# Patient Record
Sex: MALE | Race: BLACK OR AFRICAN AMERICAN | URBAN - METROPOLITAN AREA
[De-identification: names, ages, dates, MRNs, and addresses within clinical notes are randomized per-mention and may not be internally consistent; named-entity substitution may affect disease eponyms.]

---

## 2019-12-11 ENCOUNTER — HOSPITAL ENCOUNTER (OUTPATIENT)
Dept: OCCUPATIONAL THERAPY | Facility: HOSPITAL | Age: 49
End: 2019-12-12
Attending: SURGERY | Admitting: SURGERY

## 2019-12-11 LAB
ABS NEUT (OLG): 6.9 X10(3)/MCL (ref 2.1–9.2)
ALBUMIN SERPL-MCNC: 3.5 GM/DL (ref 3.4–5)
ALBUMIN/GLOB SERPL: 0.9 {RATIO}
ALP SERPL-CCNC: 81 UNIT/L (ref 45–117)
ALT SERPL-CCNC: 24 UNIT/L (ref 16–61)
APPEARANCE, UA: CLEAR
AST SERPL-CCNC: 17 UNIT/L (ref 15–37)
BASOPHILS # BLD AUTO: 0.04 X10(3)/MCL (ref 0–0.2)
BASOPHILS NFR BLD AUTO: 0.3 % (ref 0–0.9)
BILIRUB SERPL-MCNC: 0.4 MG/DL (ref 0.2–1)
BILIRUB UR QL STRIP: NEGATIVE
BILIRUBIN DIRECT+TOT PNL SERPL-MCNC: 0.12 MG/DL (ref 0–0.2)
BILIRUBIN DIRECT+TOT PNL SERPL-MCNC: 0.28 MG/DL (ref 0–1)
BUN SERPL-MCNC: 15 MG/DL (ref 7–18)
CALCIUM SERPL-MCNC: 8.3 MG/DL (ref 8.5–10.1)
CHLORIDE SERPL-SCNC: 104 MMOL/L (ref 98–107)
CO2 SERPL-SCNC: 29 MMOL/L (ref 21–32)
COLOR UR: YELLOW
CREAT SERPL-MCNC: 0.89 MG/DL (ref 0.7–1.3)
EOSINOPHIL # BLD AUTO: 0.18 X10(3)/MCL (ref 0–0.9)
EOSINOPHIL NFR BLD AUTO: 1.6 % (ref 0–6.5)
ERYTHROCYTE [DISTWIDTH] IN BLOOD BY AUTOMATED COUNT: 13.7 % (ref 11.5–17)
GLOBULIN SER-MCNC: 4 GM/DL (ref 2–4)
GLUCOSE (UA): NEGATIVE
GLUCOSE SERPL-MCNC: 80 MG/DL (ref 74–106)
HCT VFR BLD AUTO: 34.3 % (ref 42–52)
HGB BLD-MCNC: 11.5 GM/DL (ref 14–18)
HGB UR QL STRIP: NEGATIVE
IMM GRANULOCYTES # BLD AUTO: 0.04 10*3/UL (ref 0–0.02)
IMM GRANULOCYTES NFR BLD AUTO: 0.3 % (ref 0–0.43)
KETONES UR QL STRIP: NEGATIVE
LEUKOCYTE ESTERASE UR QL STRIP: NEGATIVE
LYMPHOCYTES # BLD AUTO: 3.36 X10(3)/MCL (ref 0.6–4.6)
LYMPHOCYTES NFR BLD AUTO: 29.1 % (ref 16.2–38.3)
MCH RBC QN AUTO: 30 PG (ref 27–31)
MCHC RBC AUTO-ENTMCNC: 33.5 GM/DL (ref 33–36)
MCV RBC AUTO: 89.6 FL (ref 80–94)
MONOCYTES # BLD AUTO: 1.04 X10(3)/MCL (ref 0.1–1.3)
MONOCYTES NFR BLD AUTO: 9 % (ref 4.7–11.3)
NEUTROPHILS # BLD AUTO: 6.9 X10(3)/MCL (ref 2.1–9.2)
NEUTROPHILS NFR BLD AUTO: 59.7 % (ref 49.1–73.4)
NITRITE UR QL STRIP: NEGATIVE
NRBC BLD AUTO-RTO: 0 % (ref 0–0.2)
PH UR STRIP: 6 [PH] (ref 5–7)
PLATELET # BLD AUTO: 188 X10(3)/MCL (ref 130–400)
PMV BLD AUTO: 11 FL (ref 7.4–10.4)
POTASSIUM SERPL-SCNC: 3.8 MMOL/L (ref 3.5–5.1)
PROT SERPL-MCNC: 7.9 GM/DL (ref 6.4–8.2)
PROT UR QL STRIP: NEGATIVE
RBC # BLD AUTO: 3.83 X10(6)/MCL (ref 4.7–6.1)
SODIUM SERPL-SCNC: 138 MMOL/L (ref 136–145)
SP GR UR STRIP: >=1.03 (ref 1–1.03)
UROBILINOGEN UR STRIP-ACNC: NEGATIVE
WBC # SPEC AUTO: 11.6 X10(3)/MCL (ref 4.5–11.5)

## 2019-12-12 LAB
ABS NEUT (OLG): 11.06 X10(3)/MCL (ref 2.1–9.2)
BASOPHILS # BLD AUTO: 0 X10(3)/MCL (ref 0–0.2)
BASOPHILS NFR BLD AUTO: 0 %
BUN SERPL-MCNC: 12 MG/DL (ref 7–18)
CALCIUM SERPL-MCNC: 8.8 MG/DL (ref 8.5–10.1)
CHLORIDE SERPL-SCNC: 103 MMOL/L (ref 98–107)
CO2 SERPL-SCNC: 29 MMOL/L (ref 21–32)
CREAT SERPL-MCNC: 0.83 MG/DL (ref 0.7–1.3)
CREAT/UREA NIT SERPL: 14.5
EOSINOPHIL # BLD AUTO: 0.1 X10(3)/MCL (ref 0–0.9)
EOSINOPHIL NFR BLD AUTO: 1 %
ERYTHROCYTE [DISTWIDTH] IN BLOOD BY AUTOMATED COUNT: 14 % (ref 11.5–17)
GLUCOSE SERPL-MCNC: 85 MG/DL (ref 74–106)
HCT VFR BLD AUTO: 34.9 % (ref 42–52)
HGB BLD-MCNC: 11.5 GM/DL (ref 14–18)
LYMPHOCYTES # BLD AUTO: 3.6 X10(3)/MCL (ref 0.6–4.6)
LYMPHOCYTES NFR BLD AUTO: 22 %
MCH RBC QN AUTO: 29.4 PG (ref 27–31)
MCHC RBC AUTO-ENTMCNC: 33 GM/DL (ref 33–36)
MCV RBC AUTO: 89.3 FL (ref 80–94)
MONOCYTES # BLD AUTO: 1.3 X10(3)/MCL (ref 0.1–1.3)
MONOCYTES NFR BLD AUTO: 8 %
NEUTROPHILS # BLD AUTO: 11.06 X10(3)/MCL (ref 2.1–9.2)
NEUTROPHILS NFR BLD AUTO: 69 %
PLATELET # BLD AUTO: 231 X10(3)/MCL (ref 130–400)
PMV BLD AUTO: 11.7 FL (ref 9.4–12.4)
POTASSIUM SERPL-SCNC: 3.8 MMOL/L (ref 3.5–5.1)
RBC # BLD AUTO: 3.91 X10(6)/MCL (ref 4.7–6.1)
SODIUM SERPL-SCNC: 136 MMOL/L (ref 136–145)
WBC # SPEC AUTO: 16.1 X10(3)/MCL (ref 4.5–11.5)

## 2022-04-30 NOTE — OP NOTE
Patient:   Cody Preciado Jr             MRN: [id number]            FIN: [id number]               Age:   49 years     Sex:  Male     :  1970   Associated Diagnoses:   Acute appendicitis; Abdominal pain   Author:   Ga Luciano MD      Operative Note   Operative Information   Date/ Time:  2019 15:43:00.     Procedures Performed: Procedure Code   Appendectomy Laparoscopic (.) on 2019 at 49 Years.  Comments:  2019 8:15 AYDEN - Marely AMAYA, Yousuf OLVERA  auto-populated from documented surgical case.     Indications: acute appendicitis.     Preoperative Diagnosis: Acute appendicitis ([id number]-EI K35.80), Abdominal pain (PNED [id number]).     Postoperative Diagnosis: Acute appendicitis (EKJ37-PE K35.80).     Surgeon: Ga Luciano MD.     Anesthesia: geta.     Speciman Removed: appendix.     Description of Procedure/Findings/    Complications: pt laid supine on OR table    intubated endotracheally    cooney catheter inserted    prepped and drapped in sterile fashion.    open bazan technique used to place infraumbilical 12mm port    pneumoperitoneum acheived    5mm ports placed in suprapubis and left flank under direct vision with no injury to underlying structures.    there was moderate amount of periappendiceal fluid and the appendix was inflammed but not grossly perforated.    The appendix was elevated and the maryland retractor was used to create a window in the mesoappendix adjacent to cecum    the 45mm blue load endoscopic stapler was used to separate the appendix from the cecum    the 45m white vascular endoscopic stapler load was used to divide the mesoappendix    the appendix was removed from the abdomen with the endocatch bag.    the base of appendix was irrigated copiusly     all trocars were removed under direct vision    the 12mm port site was closed with 0 vicryl sutures and the skin incisions were closed with 4-0 vicryl    pt extubated and taken  to pacu in stable satisfactory condition.     Esimated blood loss: No blood loss.     Findings: nonruptured appendicitis.

## 2022-04-30 NOTE — ED PROVIDER NOTES
Patient:   Cody Preciado Jr             MRN: [id number]            FIN: [id number]               Age:   49 years     Sex:  Male     :  1970   Associated Diagnoses:   Acute appendicitis   Author:   Marian Diaz MD      Basic Information   Additional information: Chief Complaint from Nursing Triage Note : Chief Complaint   2019 16:24 CST     Chief Complaint           pt c/o r. sided abd pain that radiates to groin onset this AM.  .      History of Present Illness   The patient presents with abdominal pain and Mr. Preciado is a 49-year-old -American male with a past medical historyof substance abuse requesting no narcotics as he is in Rehab, and a histor of Bipolar, complaining of generalized abdominal pain which started this morning and became progressively worse and now is in the right lower quadrant.  These had no appetite, last meal yesterday.  Last bowel movement was this morning.  He's had no nausea, vomiting, diarrhea.  He has no dysuria or penile discharge.  The onset was 1  days ago.  The course/duration of symptoms is constant.  The character of symptoms is achy.  The degree at onset was moderate.  The Location of pain at onset was diffuse.  The degree at present is severe.  The Location of pain at present is right, lower and abdominal.  Radiating pain: none. The exacerbating factor is none.  The relieving factor is none.  Therapy today: none.  Risk factors consist of none.  Associated symptoms: No appetite and denies nausea.        Review of Systems   Gastrointestinal symptoms:  Abdominal pain.             Additional review of systems information: All other systems reviewed and otherwise negative.      Health Status   Allergies:    Allergic Reactions (Selected)  No Known Allergies,    Allergies (1) Active Reaction  No Known Allergies None Documented  .   Medications:  (Selected)   Prescriptions  Prescribed  Keppra 500 mg oral tablet: 500 mg = 1 tab(s), Oral, BID, # 60  tab(s), 0 Refill(s)  Documented Medications  Documented  Chantix Continuing Month 1 mg oral tablet: 1 mg = 1 tab(s), Oral, BID  QUEtiapine 100 mg oral tablet: 100 mg = 1 tab(s), Oral, qPM  amlodipine 10 mg oral tablet: 10 mg = 1 tab(s), Oral, Daily, # 90 tab(s), 0 Refill(s)  carvedilol 3.125 mg oral tablet: 3.125 mg = 1 tab(s), Oral, BID, # 180 tab(s), 0 Refill(s)  escitalopram 20 mg oral tablet: 20 mg = 1 tab(s), Oral, Daily  lisinopril 20 mg oral tablet: 20 mg = 1 tab(s), Oral, Daily.      Past Medical/ Family/ Social History   Medical history:    Resolved  Cocaine abuse ([id number]):  Resolved.  Epilepsy (155747373):  Resolved.  Bipolar disorder (92260146):  Resolved.  Tobacco abuse (086147675):  Resolved.  Headache disorder (654252479):  Resolved., Reviewed as documented in chart.   Surgical history: Negative.   Family history: Not significant.   Social history:    Social & Psychosocial Habits    Employment/School  10/05/2015  Status: Unemployed    Previous employment/school: offshore worker    Comment: completed 7th grade - 10/05/2015 13:19 - [employer] AMAYA, [employer]    Home/Environment  10/05/2015  Lives with: Significant other    Living situation: Home/Independent    Alcohol abuse in household: No    Substance abuse in household: No    Smoker in household: Yes    Injuries/Abuse/Neglect in household: No    Feels unsafe at home: No    Safe place to go: Yes    Family/Friends available to help: Yes    Concern for family members at home: No    Financial concerns: Yes    Nutrition/Health  10/05/2015  Type of diet: Regular    Wants to lose weight: No    Sleeping concerns: Yes    Feels highly stressed: Yes    Sexual  10/05/2015  Sexually active: Yes    Substance Use  12/11/2019  Use: Past    Tobacco  10/05/2015  Use: Current every day smoker    Type: Cigarettes    Tobacco use per day: 10    Started at age: 13.0 Years    Previous treatment: None    Ready to change: No    04/20/2019  Use: 10 or more cigarettes (1/    Patient  Wants Consult For Cessation Counseling No    10/07/2019  Use: 10 or more cigarettes (1/    Patient Wants Consult For Cessation Counseling No    12/11/2019  Use: 10 or more cigarettes (1/    Type: Cigarettes    Patient Wants Consult For Cessation Counseling Yes    Abuse/Neglect  10/07/2019  SHX Any signs of abuse or neglect No    12/11/2019  SHX Any signs of abuse or neglect No  , Tobacco use: Regularly.   Problem list:    Active Problems (1)  Substance abuse  Bipolar disorder  , per nurse's notes.      Physical Examination               Vital Signs   Vital Signs   12/11/2019 16:24 CST     Temperature Oral          36.9 DegC                             Temperature Oral (calculated)             98.42 DegF                             Peripheral Pulse Rate     60 bpm                             Respiratory Rate          19 br/min                             SpO2                      99 %                             Oxygen Therapy            Room air                             Systolic Blood Pressure   114 mmHg                             Diastolic Blood Pressure  75 mmHg  .   Basic Oxygen Information   12/11/2019 16:24 CST     SpO2                      99 %                             Oxygen Therapy            Room air  .   General:  Alert, no acute distress.    Skin:  Warm, dry.    Eye:  Pupils are equal, round and reactive to light.   Ears, nose, mouth and throat:  Oral mucosa moist.   Cardiovascular:  Regular rate and rhythm.   Respiratory:  Lungs are clear to auscultation.   Gastrointestinal:  Soft, Non distended, No organomegaly, Tenderness: Moderate, right lower quadrant, Guarding: Minimal, Rebound: Negative, Bowel sounds: Normal, Mass: Negative, Signs: Rovsing's.    Musculoskeletal:  Ambulatory without assistance.   Neurological:  Alert and oriented to person, place, time, and situation, No focal neurological deficit observed.    Psychiatric:  Cooperative, appropriate mood & affect.       Medical Decision Making    Differential Diagnosis:  Appendicitis.   Rationale:  Walters score = 7, probable appendicitis.   Documents reviewed:  Emergency department nurses' notes.   Orders  Launch Orders   Laboratory:  Urinalysis with Microscopic if Indicated (Order): Stat collect, Urine, 12/11/2019 16:52 CST, Nurse collect  CMP (Order): Stat collect, 12/11/2019 16:52 CST, Blood, Lab Collect, 12/11/2019 16:52 CST  CBC w/ Auto Diff (Order): Now collect, 12/11/2019 16:52 CST, Blood, Lab Collect, 12/11/2019 16:52 CST  Patient Care:  Saline Lock Insert (Order): 12/11/2019 16:52 CST  Pharmacy:  Toradol 30 mg for IV Push (Order): 30 mg, form: Injection, IV Push, Once-NOW, first dose 12/11/2019 16:53 CST, stop date 12/11/2019 16:53 CST, STAT  Sodium Chloride 0.9% intravenous solution (Sodium Chloride 0.9%) (Order): 1,000 mL, 1,000 mL, IV, 1,000 mL/hr, start date 12/11/2019 16:53 CST, stop date 12/11/2019 16:53 CST  Radiology:  CT Abdomen and Pelvis W Contrast (Order): Stat, 12/11/2019 16:52 CST, Other (please specify), None, Stretcher, RLQ abdominal pain, Rad Type, Schedule this test.   Results review:  Lab results : Lab View   12/11/2019 16:53 CST     UA Appear                 CLEAR                             UA Color                  YELLOW                             UA Spec Grav              >=1.030                             UA Bili                   Negative                             UA pH                     6.0                             UA Urobilinogen           Negative                             UA Blood                  Negative                             UA Glucose                Negative                             UA Ketones                Negative                             UA Protein                Negative                             UA Nitrite                Negative                             UA Leuk Est               Negative    12/11/2019 16:40 CST     Sodium Lvl                138 mmol/L                              Potassium Lvl             3.8 mmol/L                             Chloride                  104 mmol/L                             CO2                       29.0 mmol/L                             Calcium Lvl               8.3 mg/dL  LOW                             Glucose Lvl               80 mg/dL                             BUN                       15.0 mg/dL                             Creatinine                0.89 mg/dL                             eGFR-AA                   >60 mL/min/1.73 m2  NA                             eGFR-OC                  >60 mL/min/1.73 m2  NA                             Bili Total                0.4 mg/dL                             Bili Direct               0.12 mg/dL                             Bili Indirect             0.28 mg/dL                             AST                       17 unit/L                             ALT                       24 unit/L                             Alk Phos                  81 unit/L                             Total Protein             7.9 gm/dL                             Albumin Lvl               3.5 gm/dL                             Globulin                  4 gm/dL                             A/G Ratio                 0.9  NA                             WBC                       11.6 x10(3)/mcL  HI                             RBC                       3.83 x10(6)/mcL  LOW                             Hgb                       11.5 gm/dL  LOW                             Hct                       34.3 %  LOW                             Platelet                  188 x10(3)/mcL                             MCV                       89.6 fL                             MCH                       30.0 pg                             MCHC                      33.5 gm/dL                             RDW                       13.7 %                             MPV                       11.0 fL  HI                             Abs Neut                   6.90 x10(3)/mcL                             Neutro Auto               59.7 %                             Lymph Auto                29.1 %                             Mono Auto                 9.0 %                             Eos Auto                  1.6 %                             Abs Eos                   0.18 x10(3)/mcL                             Basophil Auto             0.3 %                             Abs Neutro                6.90 x10(3)/mcL                             Abs Lymph                 3.36 x10(3)/mcL                             Abs Mono                  1.04 x10(3)/mcL                             Abs Baso                  0.04 x10(3)/mcL                             NRBC%                     0.0 %                             IG%                       0.300 %                             IG#                       0.0400  HI  .   Radiology results:  Rad Results (ST)  < 12 hrs   Accession: [id number]  Order: CT Abdomen and Pelvis W Contrast  Report Dt/Tm: 12/11/2019 17:51  Report:   CT Abdomen and Pelvis     Indication: Right-sided pain radiating to the groin     Comparison: None     Technique:  Axial CT images were obtained through the abdomen and  pelvis after IV contrast administration.    . Coronal and sagittal  reconstructions submitted and interpreted.  Total . Automatic  exposure control utilized.     Findings:     Visualized lungs are clear.     Liver, gallbladder, kidneys, adrenals, pancreas and spleen are normal.     Stomach is normal. The appendix is fluid-filled and dilated up to 10  mm with minimal surrounding inflammation. Adjacent small bowel loops  are fluid-filled and nondilated suggestive of an ileus. Colon is  normal. No free fluid or pneumoperitoneum.     No enlarged lymph nodes. Abdominal aorta is normal in caliber.     Urinary bladder is normal. Prostate is normal size.     No acute osseous findings.     Impression:   1. Acute appendicitis without abscess formation or  free air      .      Impression and Plan   Diagnosis   Acute appendicitis ([id number] K35.80)      Calls-Consults   -  12/11/2019 18:13:00 , Surgery on call paged.  MD is scrubbed in the OR, Dr Sheyla Cooper, and he asks that the pt be transferred to the ER at [facility], Accepted for ER to ER transfer by Dr Gallagher.    Plan   Condition: Stable.    Disposition: Transfer to other location: Time: 12/11/2019 18:14:00, Facility name: [facility].

## 2022-04-30 NOTE — ED PROVIDER NOTES
Patient:   Cody Preciado Jr             MRN: [id number]            FIN: [id number]               Age:   49 years     Sex:  Male     :  1970   Associated Diagnoses:   Acute appendicitis   Author:   Angélica Pickett MD      Basic Information   Time seen: Date & time 2019 19:59:00.   History source: Patient.   Arrival mode: Ambulance.   History limitation: None.   Additional information: Chief Complaint from Nursing Triage Note : Chief Complaint   2019 16:24 CST     Chief Complaint           pt c/o r. sided abd pain that radiates to groin onset this AM.  .      History of Present Illness   The patient presents with abdominal pain.  The onset was this AM.  The course/duration of symptoms is constant and worsening.  The character of symptoms is crampy and pressure.  The degree at onset was minimal.  The Location of pain at onset was diffuse.  The degree at present is moderate.  The Location of pain at present is right and lower.  Radiating pain: none. The exacerbating factor is none.  The relieving factor is none.  Therapy today: doctor's office visit ([facility]) and emergency medical services.  Risk factors consist of none.  Associated symptoms: nausea, denies vomiting, denies diarrhea and denies fever.     49-year-old male presents to emergency department as transfer from  or  emergency department after diagnosis of acute appendicitis for surgical consultation.  He reports abdominal pain starting this morning, gradually lateralizing to the right abdomen radiating to the right lower quadrant/right groin with associated nausea, no vomiting.  He denies any fever or chills.  Labs available facility with mild leukocytosis, CT scan demonstrates acute appendicitis without perforation or abscess.      Review of Systems   Constitutional symptoms:  No fever, no chills.    Skin symptoms:  No rash,    Respiratory symptoms:  No shortness of breath,    Cardiovascular symptoms:  No chest pain,     Gastrointestinal symptoms:  Abdominal pain, nausea, no vomiting, no diarrhea, no constipation.    Genitourinary symptoms:  No dysuria, no hematuria.              Additional review of systems information: All other systems reviewed and otherwise negative.      Health Status   Allergies:    Allergic Reactions (Selected)  No Known Allergies.   Medications:  (Selected)   Prescriptions  Prescribed  Keppra 500 mg oral tablet: 500 mg = 1 tab(s), Oral, BID, # 60 tab(s), 0 Refill(s)  Documented Medications  Documented  Chantix Continuing Month 1 mg oral tablet: 1 mg = 1 tab(s), Oral, BID  QUEtiapine 100 mg oral tablet: 100 mg = 1 tab(s), Oral, qPM  amlodipine 10 mg oral tablet: 10 mg = 1 tab(s), Oral, Daily, # 90 tab(s), 0 Refill(s)  carvedilol 3.125 mg oral tablet: 3.125 mg = 1 tab(s), Oral, BID, # 180 tab(s), 0 Refill(s)  escitalopram 20 mg oral tablet: 20 mg = 1 tab(s), Oral, Daily  lisinopril 20 mg oral tablet: 20 mg = 1 tab(s), Oral, Daily.      Past Medical/ Family/ Social History   Medical history:    Resolved  Cocaine abuse ([id number]):  Resolved.  Epilepsy (877567443):  Resolved.  Bipolar disorder (69371659):  Resolved.  Tobacco abuse ([id number]):  Resolved.  Headache disorder (364071193):  Resolved., Reviewed as documented in chart.   Surgical history:    denies..   Family history:    No family history items have been selected or recorded..   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    Home/Environment  10/05/2015  Lives with: Significant other    Living situation: Home/Independent    Alcohol abuse in household: No    Substance abuse in household: No    Smoker in household: Yes    Injuries/Abuse/Neglect in household: No    Feels unsafe at home: No    Safe place to go: Yes    Family/Friends available to help: Yes    Concern for family  members at home: No    Financial concerns: Yes    Nutrition/Health  10/05/2015  Type of diet: Regular    Wants to lose weight: No    Sleeping concerns: Yes    Feels highly stressed: Yes    Sexual  10/05/2015  Sexually active: Yes    Substance Use  12/11/2019  Use: Past    Tobacco  10/05/2015  Use: Current every day smoker    Type: Cigarettes    Tobacco use per day: 10    Started at age: 13.0 Years    Previous treatment: None    Ready to change: No    04/20/2019  Use: 10 or more cigarettes (1/    Patient Wants Consult For Cessation Counseling No    10/07/2019  Use: 10 or more cigarettes (1/    Patient Wants Consult For Cessation Counseling No    12/11/2019  Use: 10 or more cigarettes (1/    Type: Cigarettes    Patient Wants Consult For Cessation Counseling Yes    Abuse/Neglect  10/07/2019  SHX Any signs of abuse or neglect No    12/11/2019  SHX Any signs of abuse or neglect No  , Reviewed as documented in chart.   Problem list:    Active Problems (1)  No Chronic Problems   .      Physical Examination               Vital Signs   Vital Signs   12/11/2019 19:13 CST     Temperature Oral          36.8 DegC                             Temperature Oral (calculated)             98.24 DegF                             Peripheral Pulse Rate     81 bpm                             Respiratory Rate          13 br/min                             SpO2                      100 %                             Oxygen Therapy            Room air                             Systolic Blood Pressure   131 mmHg                             Diastolic Blood Pressure  72 mmHg    12/11/2019 18:18 CST     Temperature Oral          37.0 DegC                             Temperature Oral (calculated)             98.60 DegF                             Peripheral Pulse Rate     73 bpm                             Respiratory Rate          17 br/min                             SpO2                      100 %                             Oxygen Therapy             Room air                             Systolic Blood Pressure   131 mmHg                             Diastolic Blood Pressure  73 mmHg    12/11/2019 16:24 CST     Temperature Oral          36.9 DegC                             Temperature Oral (calculated)             98.42 DegF                             Peripheral Pulse Rate     60 bpm                             Respiratory Rate          19 br/min                             SpO2                      99 %                             Oxygen Therapy            Room air                             Systolic Blood Pressure   114 mmHg                             Diastolic Blood Pressure  75 mmHg  .      Vital Signs (last 24 hrs)_____  Last Charted___________  Temp Oral     36.8 DegC  (DEC 11 19:13)  Heart Rate Peripheral   81 bpm  (DEC 11 19:13)  Resp Rate         13 br/min  (DEC 11 19:13)  SBP      131 mmHg  (DEC 11 19:13)  DBP      72 mmHg  (DEC 11 19:13)  SpO2      100 %  (DEC 11 19:13)  .   Measurements   12/11/2019 16:24 CST     Weight Dosing             80 kg                             Weight Measured and Calculated in Lbs     176.37 lb                             Weight Estimated          80 kg                             Height/Length Dosing      180 cm                             Height/Length Estimated   180 cm                             Body Mass Index Estimated 24.69 kg/m2  .   Basic Oxygen Information   12/11/2019 19:13 CST     SpO2                      100 %                             Oxygen Therapy            Room air    12/11/2019 18:18 CST     SpO2                      100 %                             Oxygen Therapy            Room air    12/11/2019 16:24 CST     SpO2                      99 %                             Oxygen Therapy            Room air  .   General:  Alert, no acute distress.    Skin:  Warm, dry.    Head:  Normocephalic, atraumatic.    Neck:  Supple, trachea midline.    Eye:  Normal conjunctiva.   Ears, nose, mouth and  throat:  Oral mucosa moist.   Cardiovascular:  Regular rate and rhythm, Normal peripheral perfusion, No edema.    Respiratory:  Respirations are non-labored.   Gastrointestinal:  Soft, Non distended, Normal bowel sounds, Tenderness: Moderate, right lower quadrant, Guarding: Minimal, voluntary, right lower quadrant, Rebound: Negative.    Neurological:  Alert and oriented to person, place, time, and situation.   Psychiatric:  Cooperative.      Medical Decision Making   Rationale:  Previous hospital workup consistent with acute appendicitis. He had received IV Zosyn prior to transport.  Case discussed with surgical hospitalist who agrees to admission, plan OR tomorrow. Findings and plan discussed with the patient, and he is agreeable to admission at this time.   .   Documents reviewed:  Emergency department nurses' notes, emergency department records, prior records, Launch Documents   12/11/2019 17:45 CST     CT Abdomen and Pelvis W Contrast              12/11/2019 16:47 CST     ED Note-Physician         Abdominal pain  .    Results review:  Lab results : Lab View   12/11/2019 16:53 CST     UA Appear                 CLEAR                             UA Color                  YELLOW                             UA Spec Grav              >=1.030                             UA Bili                   Negative                             UA pH                     6.0                             UA Urobilinogen           Negative                             UA Blood                  Negative                             UA Glucose                Negative                             UA Ketones                Negative                             UA Protein                Negative                             UA Nitrite                Negative                             UA Leuk Est               Negative    12/11/2019 16:40 CST     Sodium Lvl                138 mmol/L                             Potassium Lvl             3.8  mmol/L                             Chloride                  104 mmol/L                             CO2                       29.0 mmol/L                             Calcium Lvl               8.3 mg/dL  LOW                             Glucose Lvl               80 mg/dL                             BUN                       15.0 mg/dL                             Creatinine                0.89 mg/dL                             eGFR-AA                   >60 mL/min/1.73 m2  NA                             eGFR-OC                  >60 mL/min/1.73 m2  NA                             Bili Total                0.4 mg/dL                             Bili Direct               0.12 mg/dL                             Bili Indirect             0.28 mg/dL                             AST                       17 unit/L                             ALT                       24 unit/L                             Alk Phos                  81 unit/L                             Total Protein             7.9 gm/dL                             Albumin Lvl               3.5 gm/dL                             Globulin                  4 gm/dL                             A/G Ratio                 0.9  NA                             WBC                       11.6 x10(3)/mcL  HI                             RBC                       3.83 x10(6)/mcL  LOW                             Hgb                       11.5 gm/dL  LOW                             Hct                       34.3 %  LOW                             Platelet                  188 x10(3)/mcL                             MCV                       89.6 fL                             MCH                       30.0 pg                             MCHC                      33.5 gm/dL                             RDW                       13.7 %                             MPV                       11.0 fL  HI                             Abs Neut                  6.90 x10(3)/mcL                              Neutro Auto               59.7 %                             Lymph Auto                29.1 %                             Mono Auto                 9.0 %                             Eos Auto                  1.6 %                             Abs Eos                   0.18 x10(3)/mcL                             Basophil Auto             0.3 %                             Abs Neutro                6.90 x10(3)/mcL                             Abs Lymph                 3.36 x10(3)/mcL                             Abs Mono                  1.04 x10(3)/mcL                             Abs Baso                  0.04 x10(3)/mcL                             NRBC%                     0.0 %                             IG%                       0.300 %                             IG#                       0.0400  HI  ,    Labs (Last four charted values)  WBC                  H 11.6 (DEC 11)   Hgb                  L 11.5 (DEC 11)   Hct                  L 34.3 (DEC 11)   Plt                  188 (DEC 11)   Na                   138 (DEC 11)   K                    3.8 (DEC 11)   CO2                  29.0 (DEC 11)   Cl                   104 (DEC 11)   Cr                   0.89 (DEC 11)   BUN                  15.0 (DEC 11)   Glucose Random       80 (DEC 11) , Interpretation Abnormal results  leukocytosis.    Radiology results:  Reviewed radiologist's report, Rad Results (ST)  < 12 hrs   Accession: [id number]  Order: CT Abdomen and Pelvis W Contrast  Report Dt/Tm: 12/11/2019 17:51  Report:   CT Abdomen and Pelvis     Indication: Right-sided pain radiating to the groin     Comparison: None     Technique:  Axial CT images were obtained through the abdomen and  pelvis after IV contrast administration.    . Coronal and sagittal  reconstructions submitted and interpreted.  Total . Automatic  exposure control utilized.     Findings:     Visualized lungs are clear.     Liver, gallbladder, kidneys, adrenals, pancreas and spleen  are normal.     Stomach is normal. The appendix is fluid-filled and dilated up to 10  mm with minimal surrounding inflammation. Adjacent small bowel loops  are fluid-filled and nondilated suggestive of an ileus. Colon is  normal. No free fluid or pneumoperitoneum.     No enlarged lymph nodes. Abdominal aorta is normal in caliber.     Urinary bladder is normal. Prostate is normal size.     No acute osseous findings.     Impression:   1. Acute appendicitis without abscess formation or free air      .       Reexamination/ Reevaluation   Vital signs   Basic Oxygen Information   12/11/2019 19:13 CST     SpO2                      100 %                             Oxygen Therapy            Room air    12/11/2019 18:18 CST     SpO2                      100 %                             Oxygen Therapy            Room air    12/11/2019 16:24 CST     SpO2                      99 %                             Oxygen Therapy            Room air        Impression and Plan   Diagnosis   Acute appendicitis ([id number] K35.80)   Plan   Condition: Stable.    Disposition: Admit time  12/11/2019 20:18:00, Place in Observation Unit, Jamie TORRES, Isadora Mata, case discussed with resident Dr. Cooper.    Counseled: Patient, Regarding diagnosis, Regarding diagnostic results, Regarding treatment plan, Patient indicated understanding of instructions.

## 2024-08-15 ENCOUNTER — HOSPITAL ENCOUNTER (OUTPATIENT)
Dept: TELEMEDICINE | Facility: HOSPITAL | Age: 54
Discharge: HOME | End: 2024-08-15

## 2024-08-15 DIAGNOSIS — R29.818 FOCAL NEUROLOGICAL DEFICIT: Primary | ICD-10-CM

## 2024-08-15 PROCEDURE — G0508 CRIT CARE TELEHEA CONSULT 60: HCPCS | Mod: GT,,, | Performed by: STUDENT IN AN ORGANIZED HEALTH CARE EDUCATION/TRAINING PROGRAM

## 2024-08-16 NOTE — SUBJECTIVE & OBJECTIVE
HPI:  54 y.o. male with medical history of HTN, smoking, CVA (2018) - with admission concerns of chest pain / COVID-19. Associated symptomatology included focal neurological deficits. And Stroke code / neurology called in for the same.      History from patient / family and medical records review. Symptoms of slurred speech / worse from baseline - > 4.5 hrs prior to tele stroke evaluation / with added c/o mild left sided weakness.     No compressive neuropathy pattern of presentation. No associated complex headaches or clinical seizures. No other focal motor or sensory or cranial nerve deficits.  No history of seizures or migraines.     Exam: awake, following commands, oriented, dysarthria, no obvious facial droop or left sided drift / no neglect     Images personally reviewed and interpreted:  Study: Head CT  Study Interpretation: Right frontal subcoritcal infarct / appears subacute to early chronic / however new from prior scans      Assessment and plan:  Exam:     Recs:  Suspect focal cerebral ischemic event (RMCA vascular territory) rather mimic / metabolic triggers. No TNK as per telestroke eval.     - MRI brain wo contrast  - CTA head and neck for any SOFI pathology    - Continue ASA / lovenox anticoagulation   - target LDL < 70 / atorvastatin  - euglycemic goals   - permissive HTN x 72 hrs post index event / long term < 140/90  - Echo f/u   - PT/OT recs - discharge planning   - F/u PCP for risk factor modification monitoring  -  on DASH diet; exercise and lifestyle modifications when appropriate   - Provide stroke counseling during the visit - Identification of signs; emergency action and ER attention; Risk factor control, optimization for secondary stroke prevention; Compliance to medications   - F/u tele vascular neurology     Lytics recommendation: Thrombolytic therapy not recommended due to Outside of treatment window   Thrombectomy recommendation: No; at this time symptoms not suggestive of large  vessel occlusion  Placement recommendation: do not transfer

## 2024-08-16 NOTE — TELEMEDICINE CONSULT
[facility]  Vascular Neurology  Comprehensive Stroke Center  TeleVascular Neurology Acute Consultation Note        Consult Information  Consults    Consulting Provider: PADMINI LYNN   Current Providers  No providers found    Patient Location: [facility] ED [facility] Emergency Department    Spoke hospital nurse at bedside with patient assisting consultant.  Patient information was obtained from patient.       Stroke Documentation  Acute Stroke Times   Last Known Normal Date: 08/15/24  Last Known Normal Time: 1400  Symptom Onset Date: 08/15/24  Symptom Onset Time: 1400  Stroke Team Called Date: 08/15/24  Stroke Team Called Time: 2145  Stroke Team Arrival Date: 08/15/24  Stroke Team Arrival Time: 2150  CT Interpretation Time: 2155  Thrombolytic Therapy Recommended: No  Thrombectomy Recommended: No    NIH Scale:  1a. Level of Consciousness: 0-->Alert, keenly responsive  1b. LOC Questions: 0-->Answers both questions correctly  1c. LOC Commands: 0-->Performs both tasks correctly  2. Best Gaze: 0-->Normal  3. Visual: 0-->No visual loss  4. Facial Palsy: 0-->Normal symmetrical movements  5a. Motor Arm, Left: 0-->No drift, limb holds 90 (or 45) degrees for full 10 secs  5b. Motor Arm, Right: 0-->No drift, limb holds 90 (or 45) degrees for full 10 secs  6a. Motor Leg, Left: 0-->No drift, leg holds 30 degree position for full 5 secs  6b. Motor Leg, Right: 0-->No drift, leg holds 30 degree position for full 5 secs  7. Limb Ataxia: 0-->Absent  8. Sensory: 0-->Normal, no sensory loss  9. Best Language: 0-->No aphasia, normal  10. Dysarthria: 1-->Mild-to-moderate dysarthria, patient slurs at least some words and, at worst, can be understood with some difficulty  11. Extinction and Inattention (formerly Neglect): 0-->No abnormality  Total (NIH Stroke Scale): 1      Modified New Site:    Venecia Coma Scale: 15   ABCD2 Score:    [id number] Score:    HAS -BLED Score:    ICH  Score:    Caballero & Gutierrez Classification:      There were no vitals taken for this visit.      In my opinion, this was a: Tier 2; VAN Stroke Assessment: Negative     Medical Decision Making  HPI:  54 y.o. male with medical history of HTN, smoking, CVA (2018) - with admission concerns of chest pain / COVID-19. Associated symptomatology included focal neurological deficits. And Stroke code / neurology called in for the same.      History from patient / family and medical records review. Symptoms of slurred speech / worse from baseline - > 4.5 hrs prior to tele stroke evaluation / with added c/o mild left sided weakness.     No compressive neuropathy pattern of presentation. No associated complex headaches or clinical seizures. No other focal motor or sensory or cranial nerve deficits.  No history of seizures or migraines.     Exam: awake, following commands, oriented, dysarthria, no obvious facial droop or left sided drift / no neglect     Images personally reviewed and interpreted:  Study: Head CT  Study Interpretation: Right frontal subcoritcal infarct / appears subacute to early chronic / however new from prior scans      Assessment and plan:  Exam:     Recs:  Suspect focal cerebral ischemic event (RMCA vascular territory) rather mimic / metabolic triggers. No TNK as per telestroke eval.     - MRI brain wo contrast  - CTA head and neck for any SOFI pathology    - Continue ASA / lovenox anticoagulation   - target LDL < 70 / atorvastatin  - euglycemic goals   - permissive HTN x 72 hrs post index event / long term < 140/90  - Echo f/u   - PT/OT recs - discharge planning   - F/u PCP for risk factor modification monitoring  -  on DASH diet; exercise and lifestyle modifications when appropriate   - Provide stroke counseling during the visit - Identification of signs; emergency action and ER attention; Risk factor control, optimization for secondary stroke prevention; Compliance to medications   - F/u tele vascular  neurology     Lytics recommendation: Thrombolytic therapy not recommended due to Outside of treatment window   Thrombectomy recommendation: No; at this time symptoms not suggestive of large vessel occlusion  Placement recommendation: do not transfer       No past medical history on file.  No past surgical history on file.  No family history on file.    Diagnoses  Problem Noted   Focal Neurological Deficit 8/15/2024       Keegan Bernal MD      Emergent/Acute neurological consultation requested by spoke provider due to critical concerns for possible cerebrovascular event that could result in permanent loss of neurologic/bodily function, severe disability or death of this patient.  Immediate/timely evaluation by a highly prepared expert is paramount for optimal outcomes  High risk for neurological deterioration if not properly diagnosed  High risk for neurological deterioration if not treated promplty/as soon as possible  Complex diagnostic evaluation may be required (advanced imaging)  High risk treatment options (thrombolytics and/or thrombectomy)    Patient care was coordinated with spoke provider, including but not limted to    Discussing likely diagnosis/etiology of symptoms  Making recommendations for further diagnostic studies  Making recommendations for intravenous thrombolytics or other advanced therapies  Making recommendations for disposition (admission/transfer for higher level of care)

## 2024-10-07 ENCOUNTER — HOSPITAL ENCOUNTER (OUTPATIENT)
Dept: TELEMEDICINE | Facility: HOSPITAL | Age: 54
Discharge: HOME | End: 2024-10-07
Payer: MEDICAID

## 2024-10-07 DIAGNOSIS — I63.9 STROKE DETERMINED BY CLINICAL ASSESSMENT: Primary | ICD-10-CM

## 2024-10-07 PROCEDURE — 99284 EMERGENCY DEPT VISIT MOD MDM: CPT | Mod: 95,,, | Performed by: STUDENT IN AN ORGANIZED HEALTH CARE EDUCATION/TRAINING PROGRAM

## 2024-10-07 NOTE — TELEMEDICINE CONSULT
[facility]  Vascular Neurology  Comprehensive Stroke Center  TeleVascular Neurology Acute Consultation Note        Consult Information  Consults    Consulting Provider: JOHNNIE MONROY   Current Providers  No providers found    Patient Location: [facility] Emergency Department    [facility] hospital nurse at bedside with patient assisting consultant.  Patient information was obtained from patient.       Stroke Documentation  Acute Stroke Times   Last Known Normal Date: 10/06/24  Last Known Normal Time: 1400  Symptom Onset Date: 10/06/24  Symptom Onset Time: 1400  Stroke Team Called Date: 10/07/24  Stroke Team Called Time: 1203  Stroke Team Arrival Date: 10/07/24  Stroke Team Arrival Time: 1208  CT completed but images not available for review - spoke to document results: 1215  Thrombolytic Therapy Recommended: No    NIH Scale:  Interval: baseline  1a. Level of Consciousness: 0-->Alert, keenly responsive  1b. LOC Questions: 1-->Answers one question correctly  1c. LOC Commands: 0-->Performs both tasks correctly  2. Best Gaze: 0-->Normal  3. Visual: 0-->No visual loss  4. Facial Palsy: 1-->Minor paralysis (flattened nasolabial fold, asymmetry on smiling)  5a. Motor Arm, Left: 0-->No drift, limb holds 90 (or 45) degrees for full 10 secs  5b. Motor Arm, Right: 3-->No effort against gravity, limb falls  6a. Motor Leg, Left: 2-->Some effort against gravity, leg falls to bed by 5 secs, but has some effort against gravity  6b. Motor Leg, Right: 0-->No drift, leg holds 30 degree position for full 5 secs  7. Limb Ataxia: 0-->Absent  8. Sensory: 1-->Mild-to-moderate sensory loss, patient feels pinprick is less sharp or is dull on the affected side, or there is a loss of superficial pain with pinprick, but patient is aware of being touched  9. Best Language: 0-->No aphasia, normal  10. Dysarthria: 0-->Normal  11. Extinction and Inattention (formerly  Neglect): 0-->No abnormality  Total (NIH Stroke Scale): 8      Modified [place]: Score: 2  [place] Coma Scale:     ABCD2 Score:    [id number] Score:    HAS -BLED Score:    ICH Score:    Hunt & Gutierrez Classification:      There were no vitals taken for this visit.      In my opinion, this was a: Tier 1; VAN Stroke Assessment: Negative     Medical Decision Making  HPI:  54 y.o. male with a PMHx significant for HTN, tobacco use, prior stroke (2018) presenting with right-sided weakness and numbness.      LKN 10/6 1400 when his symptoms started.    Seen by telestroke 08/15 with concern for stroke-like symptoms. NIHSS 1 for dysarthria at that time.     He is on aspirin and clopidogrel. No AC medications. Complaint with medications.     /83    BG WNL     Images personally reviewed and interpreted:  Study: Done but not available for review  Study Interpretation: N/A     Assessment and plan:  # Stroke determined by clinical assessment - left MCA territory. VAN- exam. He presented outside of the therapeutic window for IV thrombolysis. Imaging is pending transfer to PACS, but if no acute intracranial hemorrhage, recommend continuing DAPT, high-intensity statin. If no LVO or high-grade stenosis on CTA head/neck, patient does not require transfer for acute stroke intervention and can be admitted for stroke work-up as detailed below.     Lytics recommendation: Thrombolytic therapy not recommended due to Outside of treatment window   Thrombectomy recommendation: Awaiting CTA results from [facility] for determination   Placement recommendation: pending further studies     Imaging:   - Recommend an expedited CTA head/neck to evaluate for potential symptomatic stenosis/occlusive lesion that might significantly increase risk of recurrent or worsening signs/symptoms.   - Recommend follow-up non-urgent MRI brain without contrast to evaluate for acute ischemia.  - If above studies are abnormal, please load images to teleneurology imaging  system and contact us to review.    Antithrombotics for secondary stroke prevention: Continue aspirin 81mg and clopidogrel 75mg pending imaging results.     Statins for secondary stroke prevention and hyperlipidemia, if present: Recommend initiation or continuation of a high-intensity statin for goal LDL < 70mg/dL.    Aggressive risk factor modification: HTN, Smoking     Rehab efforts: The patient has been evaluated by a stroke team provider and the therapy needs have been fully considered based off the presenting complaints and exam findings. The following therapy evaluations are needed: PT evaluate and treat, OT evaluate and treat, SLP evaluate and treat, PM&R evaluate for appropriate placement    Diagnostics recommended:   - CTA head/neck with contrast  - MRI brain without contrast   - TTE without bubble study, monitor on telemetry while admitted  - Labs: hemoglobin A1c (goal <7%), lipid panel (LDL goal <70mg/dL), TSH  - Treat hyperglycemia to achieve a blood glucose level in a range of 140-180 mg/dL and to closely monitor to prevent hypoglycemia (Class IIa, Level C-LD).    VTE prophylaxis: Enoxaparin 40 mg SQ every 24 hours  Mechanical prophylaxis: Place SCDs    BP parameters: Infarct: No intervention, SBP <220    - Patient may be at risk for worsening deficits with positional changes or drops in blood pressure  (pressure-dependent state), or if there is extension of stroke or development of cerebral edema;  as such, recommend close neurological monitoring.  - Recommend permissive hypertension for the initial 24 to 48 hours of acute ischemic stroke unless the blood pressure is >220 mm Hg systolic or >120 mm Hg diastolic, or there is a concomitant specific medical condition that would benefit from blood pressure lowering (e.g., MI, aortic dissection).  - In the setting of possible hypovolemia, initiate IV fluids to maintain adequate hydration and euvolemia (although be cautious in patients at risk of fluid  overload, such as those with renal or heart failure).  - The optimal time after the onset of acute ischemic stroke to restart or start long-term antihypertensive therapy has not been established and may depend on various patient and stroke characteristics (e.g., pressure dependent state), but in most patients it is reasonable to initiate long-term antihypertensive therapy after the initial 48 to 72 hours from stroke onset.    Please contact us with any further questions or concerns or if patient has any acute neurological changes (new symptoms, worsening deficits).           ROS  Physical Exam  No past medical history on file.  No past surgical history on file.  No family history on file.    Diagnoses  Problem Noted   Stroke Determined By Clinical Assessment 10/7/2024       Yoli Lagunas MD      Emergent/Acute neurological consultation requested by spoke provider due to critical concerns for possible cerebrovascular event that could result in permanent loss of neurologic/bodily function, severe disability or death of this patient.  Immediate/timely evaluation by a highly prepared expert is paramount for optimal outcomes  High risk for neurological deterioration if not properly diagnosed  High risk for neurological deterioration if not treated promplty/as soon as possible  Complex diagnostic evaluation may be required (advanced imaging)  High risk treatment options (thrombolytics and/or thrombectomy)    Patient care was coordinated with spoke provider, including but not limted to    Discussing likely diagnosis/etiology of symptoms  Making recommendations for further diagnostic studies  Making recommendations for intravenous thrombolytics or other advanced therapies  Making recommendations for disposition (admission/transfer for higher level of care)

## 2024-10-07 NOTE — SUBJECTIVE & OBJECTIVE
HPI:  54 y.o. male with a PMHx significant for HTN, tobacco use, prior stroke (2018) presenting with right-sided weakness.     LKN 10/6 1400.     Seen by telestroke 08/15 with concern for stroke-like symptoms. NIHSS 1 for dysarthria at that time.     He is on aspirin and clopidogrel. No AC medications. Complaint with medications.     /83    BG WNL     Images personally reviewed and interpreted:  Study: Done but not available for review  Study Interpretation: N/A     Assessment and plan:  # Stroke determined by clinical assessment - left MCA territory. VAN- exam. He presented outside of the therapeutic window for IV thrombolysis. Imaging is pending transfer to PACS, but if no acute intracranial hemorrhage, recommend continuing DAPT, high-intensity statin. If no LVO or high-grade stenosis on CTA head/neck, patient does not require transfer for acute stroke intervention and can be admitted for stroke work-up as detailed below.     Lytics recommendation: Thrombolytic therapy not recommended due to Outside of treatment window   Thrombectomy recommendation: Awaiting CTA results from [facility] for determination   Placement recommendation: pending further studies     Imaging:   - Recommend an expedited CTA head/neck to evaluate for potential symptomatic stenosis/occlusive lesion that might significantly increase risk of recurrent or worsening signs/symptoms.   - Recommend follow-up non-urgent MRI brain without contrast to evaluate for acute ischemia.  - If above studies are abnormal, please load images to teleneurology imaging system and contact us to review.    Antithrombotics for secondary stroke prevention: Continue aspirin 81mg and clopidogrel 75mg pending imaging results.     Statins for secondary stroke prevention and hyperlipidemia, if present: Recommend initiation or continuation of a high-intensity statin for goal LDL < 70mg/dL.    Aggressive risk factor modification: HTN, Smoking     Rehab efforts: The patient  has been evaluated by a stroke team provider and the therapy needs have been fully considered based off the presenting complaints and exam findings. The following therapy evaluations are needed: PT evaluate and treat, OT evaluate and treat, SLP evaluate and treat, PM&R evaluate for appropriate placement    Diagnostics recommended:   - CTA head/neck with contrast  - MRI brain without contrast   - TTE without bubble study, monitor on telemetry while admitted  - Labs: hemoglobin A1c (goal <7%), lipid panel (LDL goal <70mg/dL), TSH  - Treat hyperglycemia to achieve a blood glucose level in a range of 140-180 mg/dL and to closely monitor to prevent hypoglycemia (Class IIa, Level C-LD).    VTE prophylaxis: Enoxaparin 40 mg SQ every 24 hours  Mechanical prophylaxis: Place SCDs    BP parameters: Infarct: No intervention, SBP <220    - Patient may be at risk for worsening deficits with positional changes or drops in blood pressure  (pressure-dependent state), or if there is extension of stroke or development of cerebral edema;  as such, recommend close neurological monitoring.  - Recommend permissive hypertension for the initial 24 to 48 hours of acute ischemic stroke unless the blood pressure is >220 mm Hg systolic or >120 mm Hg diastolic, or there is a concomitant specific medical condition that would benefit from blood pressure lowering (e.g., MI, aortic dissection).  - In the setting of possible hypovolemia, initiate IV fluids to maintain adequate hydration and euvolemia (although be cautious in patients at risk of fluid overload, such as those with renal or heart failure).  - The optimal time after the onset of acute ischemic stroke to restart or start long-term antihypertensive therapy has not been established and may depend on various patient and stroke characteristics (e.g., pressure dependent state), but in most patients it is reasonable to initiate long-term antihypertensive therapy after the initial 48 to 72 hours  from stroke onset.    Please contact us with any further questions or concerns or if patient has any acute neurological changes (new symptoms, worsening deficits).